# Patient Record
Sex: FEMALE | Race: WHITE | NOT HISPANIC OR LATINO | Employment: FULL TIME | URBAN - METROPOLITAN AREA
[De-identification: names, ages, dates, MRNs, and addresses within clinical notes are randomized per-mention and may not be internally consistent; named-entity substitution may affect disease eponyms.]

---

## 2017-04-28 DIAGNOSIS — Z00.00 ROUTINE GENERAL MEDICAL EXAMINATION AT A HEALTH CARE FACILITY: Primary | ICD-10-CM

## 2017-08-11 ENCOUNTER — HOSPITAL ENCOUNTER (OUTPATIENT)
Dept: RADIOLOGY | Facility: HOSPITAL | Age: 37
Discharge: HOME | End: 2017-08-11
Attending: INTERNAL MEDICINE
Payer: COMMERCIAL

## 2017-08-11 ENCOUNTER — CLINICAL SUPPORT (OUTPATIENT)
Dept: INTERNAL MEDICINE | Facility: CLINIC | Age: 37
End: 2017-08-11
Attending: INTERNAL MEDICINE
Payer: COMMERCIAL

## 2017-08-11 ENCOUNTER — CLINICAL SUPPORT (OUTPATIENT)
Dept: INTERNAL MEDICINE | Facility: CLINIC | Age: 37
End: 2017-08-11
Payer: COMMERCIAL

## 2017-08-11 ENCOUNTER — OFFICE VISIT (OUTPATIENT)
Dept: PULMONOLOGY | Facility: CLINIC | Age: 37
End: 2017-08-11
Payer: COMMERCIAL

## 2017-08-11 ENCOUNTER — HOSPITAL ENCOUNTER (OUTPATIENT)
Dept: CARDIOLOGY | Facility: CLINIC | Age: 37
Discharge: HOME | End: 2017-08-11
Payer: COMMERCIAL

## 2017-08-11 VITALS
BODY MASS INDEX: 36.64 KG/M2 | HEART RATE: 71 BPM | SYSTOLIC BLOOD PRESSURE: 99 MMHG | WEIGHT: 228 LBS | HEIGHT: 66 IN | DIASTOLIC BLOOD PRESSURE: 63 MMHG

## 2017-08-11 DIAGNOSIS — Z00.00 ROUTINE GENERAL MEDICAL EXAMINATION AT A HEALTH CARE FACILITY: ICD-10-CM

## 2017-08-11 DIAGNOSIS — Z00.00 ROUTINE GENERAL MEDICAL EXAMINATION AT A HEALTH CARE FACILITY: Primary | ICD-10-CM

## 2017-08-11 DIAGNOSIS — Z00.00 ANNUAL PHYSICAL EXAM: Primary | ICD-10-CM

## 2017-08-11 LAB
ALBUMIN SERPL BCP-MCNC: 3.6 G/DL
ALP SERPL-CCNC: 63 U/L
ALT SERPL W/O P-5'-P-CCNC: 17 U/L
ANION GAP SERPL CALC-SCNC: 7 MMOL/L
AST SERPL-CCNC: 19 U/L
BILIRUB SERPL-MCNC: 0.4 MG/DL
BUN SERPL-MCNC: 12 MG/DL
CALCIUM SERPL-MCNC: 9.1 MG/DL
CHLORIDE SERPL-SCNC: 108 MMOL/L
CHOLEST/HDLC SERPL: 4.3 {RATIO}
CO2 SERPL-SCNC: 25 MMOL/L
CREAT SERPL-MCNC: 0.8 MG/DL
ERYTHROCYTE [DISTWIDTH] IN BLOOD BY AUTOMATED COUNT: 13.4 %
EST. GFR  (AFRICAN AMERICAN): >60 ML/MIN/1.73 M^2
EST. GFR  (NON AFRICAN AMERICAN): >60 ML/MIN/1.73 M^2
ESTIMATED AVG GLUCOSE: 103 MG/DL
GLUCOSE SERPL-MCNC: 89 MG/DL
HBA1C MFR BLD HPLC: 5.2 %
HCT VFR BLD AUTO: 37.3 %
HDL/CHOLESTEROL RATIO: 23.4 %
HDLC SERPL-MCNC: 197 MG/DL
HDLC SERPL-MCNC: 46 MG/DL
HGB BLD-MCNC: 12.4 G/DL
HIV 1+2 AB+HIV1 P24 AG SERPL QL IA: NEGATIVE
LDLC SERPL CALC-MCNC: 119.2 MG/DL
MCH RBC QN AUTO: 28.2 PG
MCHC RBC AUTO-ENTMCNC: 33.2 G/DL
MCV RBC AUTO: 85 FL
NONHDLC SERPL-MCNC: 151 MG/DL
PLATELET # BLD AUTO: 246 K/UL
PMV BLD AUTO: 9.5 FL
POTASSIUM SERPL-SCNC: 4.5 MMOL/L
PROT SERPL-MCNC: 7 G/DL
RBC # BLD AUTO: 4.4 M/UL
SODIUM SERPL-SCNC: 140 MMOL/L
TRIGL SERPL-MCNC: 159 MG/DL
TSH SERPL DL<=0.005 MIU/L-ACNC: 1.11 UIU/ML
WBC # BLD AUTO: 6.18 K/UL

## 2017-08-11 PROCEDURE — 71020 XR CHEST PA AND LATERAL: CPT | Mod: 26,,, | Performed by: RADIOLOGY

## 2017-08-11 PROCEDURE — 86703 HIV-1/HIV-2 1 RESULT ANTBDY: CPT

## 2017-08-11 PROCEDURE — 85027 COMPLETE CBC AUTOMATED: CPT

## 2017-08-11 PROCEDURE — 99999 PR PBB SHADOW E&M-EST. PATIENT-LVL III: CPT | Mod: PBBFAC,,, | Performed by: INTERNAL MEDICINE

## 2017-08-11 PROCEDURE — 80053 COMPREHEN METABOLIC PANEL: CPT

## 2017-08-11 PROCEDURE — 83036 HEMOGLOBIN GLYCOSYLATED A1C: CPT

## 2017-08-11 PROCEDURE — 97802 MEDICAL NUTRITION INDIV IN: CPT | Mod: S$GLB,,, | Performed by: INTERNAL MEDICINE

## 2017-08-11 PROCEDURE — 99385 PREV VISIT NEW AGE 18-39: CPT | Mod: S$GLB,ICN,, | Performed by: INTERNAL MEDICINE

## 2017-08-11 PROCEDURE — 80061 LIPID PANEL: CPT

## 2017-08-11 PROCEDURE — 93000 ELECTROCARDIOGRAM COMPLETE: CPT | Mod: S$GLB,,, | Performed by: INTERNAL MEDICINE

## 2017-08-11 PROCEDURE — 36415 COLL VENOUS BLD VENIPUNCTURE: CPT

## 2017-08-11 PROCEDURE — 84443 ASSAY THYROID STIM HORMONE: CPT

## 2017-08-11 PROCEDURE — 97750 PHYSICAL PERFORMANCE TEST: CPT | Mod: S$GLB,,, | Performed by: INTERNAL MEDICINE

## 2017-08-11 PROCEDURE — 71020 XR CHEST PA AND LATERAL: CPT | Mod: TC

## 2017-08-11 RX ORDER — CETIRIZINE HYDROCHLORIDE 10 MG/1
10 TABLET ORAL
COMMUNITY

## 2017-08-11 RX ORDER — ESCITALOPRAM OXALATE 20 MG/1
20 TABLET ORAL DAILY
COMMUNITY
Start: 2017-07-20

## 2017-08-11 RX ORDER — MELOXICAM 7.5 MG/1
1 TABLET ORAL DAILY
COMMUNITY
Start: 2017-08-02

## 2017-08-11 RX ORDER — BUPROPION HYDROCHLORIDE 100 MG/1
100 TABLET, EXTENDED RELEASE ORAL DAILY
COMMUNITY
Start: 2017-07-20 | End: 2018-07-20

## 2017-08-11 RX ORDER — IBUPROFEN AND FAMOTIDINE 26.6; 8 MG/1; MG/1
1 TABLET ORAL 3 TIMES DAILY PRN
COMMUNITY
Start: 2017-07-20

## 2017-08-11 RX ORDER — FLUTICASONE PROPIONATE 50 MCG
2 SPRAY, SUSPENSION (ML) NASAL DAILY
COMMUNITY
Start: 2017-04-20

## 2017-08-11 RX ORDER — MONTELUKAST SODIUM 10 MG/1
1 TABLET ORAL NIGHTLY
COMMUNITY
Start: 2017-04-20

## 2017-08-11 NOTE — LETTER
August 11, 2017    Bessie Painting  [address] [facility]  [place] [address]             [facility]  [address]  [facility] [address]  Phone: [phone] Dear Mrs. Painting:    Thank you for allowing me to serve you and perform your Executive Health exam on 8/11/2017. This letter will serve as a brief summary of the physical findings and laboratory/studies performed and recommendations at this time. At today assessment all parameters were normal but your weight and a slight elevation of the triglycerides. I hope that the My Fitness Pal will help you address the weight issue.        If you have any questions or concerns, please don't hesitate to call.    Sincerely,        Chino Salazar MD

## 2017-08-11 NOTE — PROGRESS NOTES
Subjective:       Patient ID: Bessie Painting is a 37 y.o. female.    Chief Complaint: Annual Exam    HPI  38 yo [employer] employee comes for a periodic health exam. She and her family live in [place], [place]. Since she was covering the [employer]. She is healthy and has just assume new responsibilities that may cause her to travel more. She is originally from [place], [place]. Graduate degree from [employer] and has been with [employer] for 15 years. She is undergoing test for back by a spine specialist in [place].   Review of Systems   Constitutional: Negative.    HENT: Negative.         Jaw surgery for mal alinement of teeth     Sinus surgery   Eyes: Negative.    Respiratory: Negative.    Cardiovascular: Negative.    Gastrointestinal: Negative.    Genitourinary: Negative.    Musculoskeletal: Negative.         Knee surgery   S/P skiing   Skin: Negative.    Neurological: Negative.    Psychiatric/Behavioral: Negative.    All other systems reviewed and are negative.      Objective:      Physical Exam   Constitutional: She is oriented to person, place, and time. She appears well-developed and well-nourished. No distress.   Obese: BMI:36   HENT:   Head: Normocephalic and atraumatic.   Right Ear: External ear normal.   Left Ear: External ear normal.   Nose: Nose normal.   Mouth/Throat: Oropharynx is clear and moist.   Eyes: Conjunctivae and EOM are normal. Pupils are equal, round, and reactive to light.   Neck: Normal range of motion. Neck supple. No JVD present. No thyromegaly present.   Cardiovascular: Normal rate, regular rhythm, normal heart sounds and intact distal pulses.  Exam reveals no gallop.    No murmur heard.  Pulmonary/Chest: Breath sounds normal. No stridor. No respiratory distress. She has no wheezes. She has no rales. She exhibits no tenderness.   Peak flow 480 l/min   Abdominal: Soft. Bowel sounds are normal. She exhibits no distension and no mass. There is no tenderness. There is no rebound and  no guarding.   Musculoskeletal: Normal range of motion. She exhibits no edema.   Lymphadenopathy:     She has no cervical adenopathy.   Neurological: She is alert and oriented to person, place, and time. She has normal reflexes. She displays normal reflexes. No cranial nerve deficit.   Skin: Skin is warm and dry. No rash noted.   Psychiatric: She has a normal mood and affect. Her behavior is normal. Judgment and thought content normal.   Nursing note and vitals reviewed.      Assessment:       No diagnosis found.    Plan:             Chest x-ray is clear EKG is normal Labs:Except for a trifle elevation of the triglycerides, all parameters are normal IMP: Healthy but overweight female.

## 2017-08-14 NOTE — PROGRESS NOTES
Subjective:       Patient ID: Bessie Painting is a 37 y.o. female.    Chief Complaint: No chief complaint on file.    HPI   Mrs. Painting has reported no previous history of cardiovascular or pulmonary disease. She has reported no physical limitations to exercise.     Mrs. Painting currently works at the [employer]. She was seeing a  and exercising in the facility at the plant run by [employer]. The patient reported stopping exercise about a month ago because her and her   and the kids became too much.     Current Exercise Routine:   - None    Review of Systems    Objective:      The fitness evaluation results are as follows:    D.O.S. 8/11/2017   Height (in): 66   Weight (lbs): 228   BMI: 36.25732   Body Fat (%): 42.11   Waist (cm): 106   Hip (cm): 126   WHR: 0.84   RBP (mmHg): 102/62   RHR (bpm): 72    Strength R (lbs)t: 75    Strength Lt (lbs): 65   Push-up Assessment: 20   Curl-up Assessment: 75   Flexibility Testing (cm): 35   REE (kcals): 1780     Physical Exam    Assessment:      Age/Gender Stratified Assessment:    Resting BP: Within Testing Limits   Body Fat %: Poor   WHR Risk Factor: Moderate Risk    Strength R: Average    Strength L: Average   Upper Body Endurance: Very Good   Abdominal Endurance: Well Above Average   Lower body Flexibility: Good      1. Routine general medical examination at a health care facility        Plan:    Mrs. Painting should focus on incorporating small amounts of exercise in until she can reach below guidelines. She mentioned that she should be able to start with her  again soon to help get her back in shape.     Recommended Fitness Guidelines:   - 150 minutes of moderate intensity aerobic exercise each week OR 75 minutes of vigorous    - 2-4 days per week of resistance training for each muscle group   - Daily stretching

## 2017-08-21 NOTE — PROGRESS NOTES
"Nutrition Assessment  Client name:  Bessie Painting  :  1980  Age:  37 y.o.  Gender:  female    Client states:  Very pleasant [employer] employee here for her initial Executive Health physical.  Recently  from her spouse and is currently living in [place] with her two young daughters, ages 5 and 6.  Originally from [place] although moved to the [place] area over two years ago due to employment.  States that despite previous knowledge of nutrition education and healthy eating habits, she is a stress and emotional eater.  Has participated in weight loss clinics in [place], where she lost 60#!  However, regained such weight upon resumption of dietary habits.  Sometimes skips meals, consuming cake, cookies, cupcakes, etc. instead.  Since  from her  a few months ago, admits to eating more "comfort foods" and exercising less due to time constraints.  Was advised by MD to adhere to 1700 kcal meal plan and track daily via My Fitness Pal vanessa.  Inquired about healthy snack ideas.      Past Medical History:   Diagnosis Date    Back pain        Social History    Marital status:    Employment:  [employer] ([employer])  Social History   Substance Use Topics    Smoking status: Former Smoker     Packs/day: 1.00     Years: 14.00     Quit date: 2008    Smokeless tobacco: Never Used    Alcohol use Not on file        Current medications:  has a current medication list which includes the following prescription(s): bupropion, cetirizine, escitalopram oxalate, fluticasone, ibuprofen-famotidine, meloxicam, and montelukast.  Vitamins, minerals, and/or supplements:  None   Food allergies or intolerances:  NKFA     Food History  Breakfast:  Eggs + protein shake/Cheerios  Lunch:  Chicken or fish + vegetables  Dinner:  Crock pot chicken  Snacks:  Cookies/candy/cake/cupcakes    Exercise History:  None    Lab Reports   Total Cholesterol:  197    Triglycerides:  159  HDL:  46  LDL:  119.2   Glucose:  " "89  HbA1c:  5.2%  BP:  99/63     Weight History  Height:  5' 6"     Weight:  228#  BMI:  36.8  % Body Fat:  42.11%    Diagnosis  RMR (Method:  Body Sarpy):  1780 kcal  Activity Factor:  1.3  ROHITH:  2314 - 500 = 1814 kcal    1.  Obesity related to excessive energy intake and inadequate physical activity as evidenced by BMI:  36.8; 42.11% body fat.    2.  Altered nutrition-related laboratory values related to improper food choices and inadequate physical activity as evidenced by TGL:  159.    Intervention    Goals:  1.  TGL < 150  2.  Achieve goal weight of 200#  3.  Begin exercising 30 minutes 3x/week or more as tolerated  4.  Limit stress eating, replacing with healthy snacks (examples provided)  5.  Aim for 3 meals + 2 snacks daily  6.  Include a source of lean protein with all snacks  7.  Aim for 1169-2094 kcal daily and track via My Fitness Pal vanessa     Reviewed CMP, lipid panel, and HbA1c, noting borderline high TGL likely due to emotional eating, physical inactivity, and weight gain.  Discussed a heart-healthy diet, factors affecting TGL, and foods recommended and not.  Discussed stress/emotional eating and its overall impact on health, advising patient to use non-food approaches as coping mechanisms.  Reviewed healthy snacking, including its components and examples.  Advised patient to limit caloric intake to 1461-0343 kcal daily, tracking via My AnTuTu Pal vanessa.  Overall, while patient expressed knowledge regarding nutrition education, I strongly encouraged her to adopt healthier lifestyle habits inclusive of proper nutrition and consistent physical activity so as to promote gradual weight loss and improved health.    Handouts provided:  Meal Planning Guide  Restaurant Guide  Eat Fit Shopping List  Eat Fit Winifred  Fast Food Guide  Vitamin/Mineral Guide    Monitoring/Evaluation    Monitor the following:  Weight  BMI  % Body Fat  Caloric intake  Lipid panel    Follow Up Plan:  Follow up with client in 1-2 years    "